# Patient Record
Sex: FEMALE | URBAN - METROPOLITAN AREA
[De-identification: names, ages, dates, MRNs, and addresses within clinical notes are randomized per-mention and may not be internally consistent; named-entity substitution may affect disease eponyms.]

---

## 2018-05-13 ENCOUNTER — RECORDS - HEALTHEAST (OUTPATIENT)
Dept: LAB | Facility: CLINIC | Age: 73
End: 2018-05-13

## 2018-05-14 ENCOUNTER — RECORDS - HEALTHEAST (OUTPATIENT)
Dept: LAB | Facility: CLINIC | Age: 73
End: 2018-05-14

## 2018-05-15 LAB
ANION GAP SERPL CALCULATED.3IONS-SCNC: 9 MMOL/L (ref 5–18)
BUN SERPL-MCNC: 14 MG/DL (ref 8–28)
CALCIUM SERPL-MCNC: 8.9 MG/DL (ref 8.5–10.5)
CHLORIDE BLD-SCNC: 96 MMOL/L (ref 98–107)
CO2 SERPL-SCNC: 27 MMOL/L (ref 22–31)
CREAT SERPL-MCNC: 1.04 MG/DL (ref 0.6–1.1)
GFR SERPL CREATININE-BSD FRML MDRD: 52 ML/MIN/1.73M2
GLUCOSE BLD-MCNC: 98 MG/DL (ref 70–125)
POTASSIUM BLD-SCNC: 4.6 MMOL/L (ref 3.5–5)
SODIUM SERPL-SCNC: 132 MMOL/L (ref 136–145)

## 2018-05-16 LAB
QTF INTERPRETATION: NORMAL
QTF MITOGEN - NIL: 9.65 IU/ML
QTF NIL: 0.08 IU/ML
QTF RESULT: NEGATIVE
QTF TB ANTIGEN - NIL: -0.03 IU/ML

## 2019-01-10 LAB — PHQ9 SCORE: 13

## 2019-01-31 ENCOUNTER — TRANSFERRED RECORDS (OUTPATIENT)
Dept: HEALTH INFORMATION MANAGEMENT | Facility: CLINIC | Age: 74
End: 2019-01-31

## 2019-01-31 ENCOUNTER — MEDICAL CORRESPONDENCE (OUTPATIENT)
Dept: HEALTH INFORMATION MANAGEMENT | Facility: CLINIC | Age: 74
End: 2019-01-31

## 2019-01-31 ENCOUNTER — TELEPHONE (OUTPATIENT)
Dept: GASTROENTEROLOGY | Facility: CLINIC | Age: 74
End: 2019-01-31

## 2019-01-31 LAB — PHQ9 SCORE: 13

## 2019-02-01 ENCOUNTER — TELEPHONE (OUTPATIENT)
Dept: GASTROENTEROLOGY | Facility: CLINIC | Age: 74
End: 2019-02-01

## 2019-03-01 ENCOUNTER — TELEPHONE (OUTPATIENT)
Dept: GASTROENTEROLOGY | Facility: CLINIC | Age: 74
End: 2019-03-01

## 2019-03-01 NOTE — TELEPHONE ENCOUNTER
VM with request pt contact Endoscopy Pre-assessment RN to review upcoming procedure information.   Her home telephone numbers voicemail box is full. LVM on    [phone] Line.  Telephone call-back number provided.  Jolynn Shaw RN  [email or web address]    Additional Information regarding appointment:     ________________________________  Patient scheduled for:  colonoscopy @ [facility] on Monday March 4th

## 2019-06-12 ENCOUNTER — TELEPHONE (OUTPATIENT)
Dept: GASTROENTEROLOGY | Facility: OUTPATIENT CENTER | Age: 74
End: 2019-06-12

## 2019-06-13 ENCOUNTER — TELEPHONE (OUTPATIENT)
Dept: GASTROENTEROLOGY | Facility: OUTPATIENT CENTER | Age: 74
End: 2019-06-13

## 2019-06-14 ENCOUNTER — TELEPHONE (OUTPATIENT)
Dept: GASTROENTEROLOGY | Facility: CLINIC | Age: 74
End: 2019-06-14

## 2019-06-14 NOTE — TELEPHONE ENCOUNTER
Patient had called to state that she has bronchitis in advance of 6/17/19 Colonoscopy.  Return call attempt made to talk with patient.  Unable to leave VM with suggestion that she reschedule this visit owing full VM.     Virginie Quinteros. RN  [facility] End[facility]

## 2019-07-16 ENCOUNTER — TELEPHONE (OUTPATIENT)
Dept: GASTROENTEROLOGY | Facility: OUTPATIENT CENTER | Age: 74
End: 2019-07-16

## 2019-07-17 ENCOUNTER — TELEPHONE (OUTPATIENT)
Dept: GASTROENTEROLOGY | Facility: OUTPATIENT CENTER | Age: 74
End: 2019-07-17

## 2019-07-18 ENCOUNTER — TELEPHONE (OUTPATIENT)
Dept: GASTROENTEROLOGY | Facility: OUTPATIENT CENTER | Age: 74
End: 2019-07-18

## 2019-07-19 ENCOUNTER — TELEPHONE (OUTPATIENT)
Dept: GASTROENTEROLOGY | Facility: OUTPATIENT CENTER | Age: 74
End: 2019-07-19

## 2019-07-19 DIAGNOSIS — Z12.11 ENCOUNTER FOR SCREENING COLONOSCOPY: Primary | ICD-10-CM

## 2019-07-19 RX ORDER — BISACODYL 5 MG/1
10 TABLET, DELAYED RELEASE ORAL ONCE
Qty: 4 TABLET | Refills: 0 | Status: SHIPPED | OUTPATIENT
Start: 2019-07-19 | End: 2019-07-19

## 2019-07-19 NOTE — TELEPHONE ENCOUNTER
Patient taking any blood thinners ? No     Heart disease ? Denies     Lung disease ? Denies       Sleep apnea ? No Cpap    Diabetic ? Type 2    Kidney disease ? Denies     Electronic implanted medical devices ? Denies     Are you taking any narcotic pain medication ? Denies       PTSD ? N/a     Prep instructions reviewed with patient ? Instructions, policy,MAC sedation plan reviewed. Advised patient to have someone stay with them post exam.    Pharmacy : [facility]    Indication for procedure : Screening colonosocpy    Referring provider : Rafael Harrison MD     Arrival Time : 2 PM

## 2019-07-22 ENCOUNTER — DOCUMENTATION ONLY (OUTPATIENT)
Dept: GASTROENTEROLOGY | Facility: OUTPATIENT CENTER | Age: 74
End: 2019-07-22
Payer: COMMERCIAL

## 2021-04-08 ENCOUNTER — APPOINTMENT (OUTPATIENT)
Dept: FAMILY MEDICINE | Facility: CLINIC | Age: 76
End: 2021-04-08